# Patient Record
Sex: FEMALE | Race: WHITE | Employment: OTHER | URBAN - METROPOLITAN AREA
[De-identification: names, ages, dates, MRNs, and addresses within clinical notes are randomized per-mention and may not be internally consistent; named-entity substitution may affect disease eponyms.]

---

## 2017-01-03 DIAGNOSIS — E11.40 TYPE 2 DIABETES MELLITUS WITH DIABETIC NEUROPATHY, WITHOUT LONG-TERM CURRENT USE OF INSULIN (H): Primary | ICD-10-CM

## 2017-01-03 NOTE — TELEPHONE ENCOUNTER
[facility] is calling and states that they need a rx for lancets  And contour test strips, please advise.     Bonnie Weaver, [place]

## 2017-01-04 ENCOUNTER — MYC MEDICAL ADVICE (OUTPATIENT)
Dept: FAMILY MEDICINE | Facility: CLINIC | Age: 67
End: 2017-01-04

## 2017-01-04 DIAGNOSIS — E11.40 TYPE 2 DIABETES MELLITUS WITH DIABETIC NEUROPATHY, WITHOUT LONG-TERM CURRENT USE OF INSULIN (H): Primary | ICD-10-CM

## 2017-01-04 RX ORDER — LANCETS
EACH MISCELLANEOUS
Qty: 100 EACH | Refills: 3 | Status: SHIPPED | OUTPATIENT
Start: 2017-01-04

## 2017-01-04 RX ORDER — LANCETS
EACH MISCELLANEOUS
Qty: 100 EACH | Status: SHIPPED | OUTPATIENT
Start: 2017-01-04 | End: 2017-01-04

## 2017-01-04 NOTE — TELEPHONE ENCOUNTER
Prescription approved per [facility] Refill Protocol.  Minal Miranda RN

## 2017-01-06 ENCOUNTER — MYC MEDICAL ADVICE (OUTPATIENT)
Dept: FAMILY MEDICINE | Facility: CLINIC | Age: 67
End: 2017-01-06

## 2017-01-24 ENCOUNTER — TELEPHONE (OUTPATIENT)
Dept: FAMILY MEDICINE | Facility: CLINIC | Age: 67
End: 2017-01-24

## 2017-01-24 NOTE — TELEPHONE ENCOUNTER
Panel Management Review      Patient has the following on her problem list:     Diabetes    ASA: Passed    Last A1C  A1C     11.0   12/21/2016  A1C      7.8   7/15/2016  A1C      7.0   3/4/2016  A1C      6.7   9/25/2015  A1C      7.4   6/23/2015  A1C tested: Failed    Last LDL:    CHOL      161   7/15/2016  HDL       61   7/15/2016  LDL       43   7/15/2016  TRIG      286   7/15/2016  CHOLHDLRATIO      3.2   6/8/2015  NHDL      100   7/15/2016    Is the patient on a Statin? YES             Is the patient on Aspirin? YES    Medications     HMG CoA Reductase Inhibitors    rosuvastatin (CRESTOR) 20 MG tablet    Salicylates    ASPIRIN 81 MG OR TABS          Last three blood pressure readings:  BP Readings from Last 3 Encounters:   12/21/16 132/77   07/15/16 134/74   03/04/16 116/74       Date of last diabetes office visit: 12/21/2016     Tobacco History:     History   Smoking status     Former Smoker   Smokeless tobacco     Never Used     Comment: 2002           Hypertension   Last three blood pressure readings:  BP Readings from Last 3 Encounters:   12/21/16 132/77   07/15/16 134/74   03/04/16 116/74     Blood pressure: Passed    HTN Guidelines:  Age 18-59 BP range:  Less than 140/90  Age 60-85 with Diabetes:  Less than 140/90  Age 60-85 without Diabetes:  less than 150/90      Composite cancer screening  Chart review shows that this patient is due/due soon for the following None  Summary:    Patient is due/failing the following:   A1C    Action needed:   Pt needs to have A1c checked and office visit in march.    Type of outreach:    I will remind closer to time frame.     Questions for provider review:    None                                                                                                                                    Lindsay Coy [facility]       Chart routed to Care Team .

## 2017-01-30 ENCOUNTER — TELEPHONE (OUTPATIENT)
Dept: FAMILY MEDICINE | Facility: CLINIC | Age: 67
End: 2017-01-30

## 2017-01-30 DIAGNOSIS — E11.40 TYPE 2 DIABETES MELLITUS WITH DIABETIC NEUROPATHY, WITHOUT LONG-TERM CURRENT USE OF INSULIN (H): Primary | ICD-10-CM

## 2017-01-30 NOTE — TELEPHONE ENCOUNTER
Received a fax from [facility] [place]    Re: blood glucose monitoring (ANGELICA CONTOUR) test strip, Sig: Test BS 1 x / day, Disp 100 x3, Approved 01/04/2017    Request note: Patient states that she tests 4 time daily - Please send new Rx for the directions & include diagnosis code for medicare billing.

## 2017-02-08 ENCOUNTER — TELEPHONE (OUTPATIENT)
Dept: FAMILY MEDICINE | Facility: CLINIC | Age: 67
End: 2017-02-08

## 2017-02-08 NOTE — TELEPHONE ENCOUNTER
Panel Management Review      Patient has the following on her problem list:     Diabetes    ASA: Passed    Last A1C  A1C     11.0   12/21/2016  A1C      7.8   7/15/2016  A1C      7.0   3/4/2016  A1C      6.7   9/25/2015  A1C      7.4   6/23/2015  A1C tested: Failed    Last LDL:    CHOL      161   7/15/2016  HDL       61   7/15/2016  LDL       43   7/15/2016  TRIG      286   7/15/2016  CHOLHDLRATIO      3.2   6/8/2015  NHDL      100   7/15/2016    Is the patient on a Statin? YES             Is the patient on Aspirin? YES    Medications     HMG CoA Reductase Inhibitors    rosuvastatin (CRESTOR) 20 MG tablet    Salicylates    ASPIRIN 81 MG OR TABS          Last three blood pressure readings:  BP Readings from Last 3 Encounters:   12/21/16 132/77   07/15/16 134/74   03/04/16 116/74       Date of last diabetes office visit: 12/21/16     Tobacco History:     History   Smoking status     Former Smoker   Smokeless tobacco     Never Used     Comment: 2002           Hypertension   Last three blood pressure readings:  BP Readings from Last 3 Encounters:   12/21/16 132/77   07/15/16 134/74   03/04/16 116/74     Blood pressure: Passed    HTN Guidelines:  Age 18-59 BP range:  Less than 140/90  Age 60-85 with Diabetes:  Less than 140/90  Age 60-85 without Diabetes:  less than 150/90      Composite cancer screening  Chart review shows that this patient is due/due soon for the following Colonoscopy  Summary:    Patient is due/failing the following:   A1C and COLONOSCOPY    Action needed:   Patient needs office visit for diabetes in march.I will send out colonoscopy instructions and number to schedule.   Type of outreach:    Sent letter.    Questions for provider review:    None                                                                                                                                    Lindsay Coy [facility]       Chart routed to Care Team .

## 2017-02-08 NOTE — Clinical Note
[facility]  [address] [place] [address]  [id number]  [phone]      February 8, 2017      Wilda A Delgado  [id number] [address]   [facility] [address]              Dear Ms. Natarajan,    In order to ensure we are providing the best quality care,  and I have reviewed your chart and see that you are due for a Diabetes follow up, including lab work in march. I have also noticed you are due for a colonoscopy, I will go ahead and send the information to you and you can set up an appointment.     Please call to set up an office visit at [phone].    We greatly appreciate the opportunity to serve you. Thank you for trusting us with your health care.    Sincerely,      Mukesh Arguelles MD/ Lindsay FERMIN CMA

## 2017-03-17 ENCOUNTER — OFFICE VISIT (OUTPATIENT)
Dept: FAMILY MEDICINE | Facility: CLINIC | Age: 67
End: 2017-03-17
Payer: MEDICARE

## 2017-03-17 VITALS
DIASTOLIC BLOOD PRESSURE: 75 MMHG | HEART RATE: 72 BPM | WEIGHT: 205.4 LBS | BODY MASS INDEX: 31.13 KG/M2 | TEMPERATURE: 97.7 F | SYSTOLIC BLOOD PRESSURE: 105 MMHG | HEIGHT: 68 IN

## 2017-03-17 DIAGNOSIS — I10 ESSENTIAL HYPERTENSION: ICD-10-CM

## 2017-03-17 DIAGNOSIS — E03.4 HYPOTHYROIDISM DUE TO ACQUIRED ATROPHY OF THYROID: ICD-10-CM

## 2017-03-17 DIAGNOSIS — E08.49 OTHER DIABETIC NEUROLOGICAL COMPLICATION ASSOCIATED WITH DIABETES MELLITUS DUE TO UNDERLYING CONDITION (H): ICD-10-CM

## 2017-03-17 DIAGNOSIS — M17.0 PRIMARY OSTEOARTHRITIS OF BOTH KNEES: ICD-10-CM

## 2017-03-17 DIAGNOSIS — E78.5 HYPERLIPIDEMIA LDL GOAL <100: ICD-10-CM

## 2017-03-17 DIAGNOSIS — E66.3 OVERWEIGHT (BMI 25.0-29.9): ICD-10-CM

## 2017-03-17 DIAGNOSIS — E11.40 TYPE 2 DIABETES MELLITUS WITH DIABETIC NEUROPATHY, WITHOUT LONG-TERM CURRENT USE OF INSULIN (H): Primary | ICD-10-CM

## 2017-03-17 LAB — HBA1C MFR BLD: 6.7 % (ref 4.3–6)

## 2017-03-17 PROCEDURE — 36415 COLL VENOUS BLD VENIPUNCTURE: CPT | Performed by: FAMILY MEDICINE

## 2017-03-17 PROCEDURE — 99214 OFFICE O/P EST MOD 30 MIN: CPT | Mod: 25 | Performed by: FAMILY MEDICINE

## 2017-03-17 PROCEDURE — 83036 HEMOGLOBIN GLYCOSYLATED A1C: CPT | Performed by: FAMILY MEDICINE

## 2017-03-17 PROCEDURE — 90732 PPSV23 VACC 2 YRS+ SUBQ/IM: CPT | Performed by: FAMILY MEDICINE

## 2017-03-17 PROCEDURE — G0009 ADMIN PNEUMOCOCCAL VACCINE: HCPCS | Performed by: FAMILY MEDICINE

## 2017-03-17 PROCEDURE — 99207 C FOOT EXAM  NO CHARGE: CPT | Performed by: FAMILY MEDICINE

## 2017-03-17 NOTE — ASSESSMENT & PLAN NOTE
Lab Results   Component Value Date    TSH 1.32 07/15/2016     Dose stable. No s/s of over/under treatment. Refill for 1 yr if stable.

## 2017-03-17 NOTE — PATIENT INSTRUCTIONS
Type 2 diabetes mellitus with diabetic neuropathy (H)  Keep up the good work. Until you get below 6 or start having side effect(s), we can stay on the metformin as it may help you hit your goal weight.     Primary osteoarthritis of both knees  Keep losing weight and keep your legs strong.  Strong muscles will help you maintain your knee function and help you recover faster once you have surgery.     Hypothyroidism  Lab Results   Component Value Date    TSH 1.32 07/15/2016     Dose stable. No s/s of over/under treatment. Refill for 1 yr if stable.     Essential hypertension  Blood pressure is adequately controlled. Healthy lifestyle encouraged. Recheck every 6-12 months if stable and yearly labs.      Overweight (BMI 25.0-29.9)  Keep up the great work!      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      Make appointment for your colonoscopy.  :-)

## 2017-03-17 NOTE — ASSESSMENT & PLAN NOTE
Blood pressure is adequately controlled. Healthy lifestyle encouraged. Recheck every 6-12 months if stable and yearly labs.

## 2017-03-17 NOTE — PROGRESS NOTES
"  SUBJECTIVE:                                                    Wilda Natarajan is a 66 year old female who presents to clinic today for the following health issues:        Diabetes Follow-up    Patient is checking blood sugars: 2 times daily.    Blood sugar testing frequency justification: Uncontrolled diabetes  Results are as follows:         am - 100    Diabetic concerns: None     Symptoms of hypoglycemia (low blood sugar): none     Paresthesias (numbness or burning in feet) or sores: Yes      Date of last diabetic eye exam: November 2016  Lab Results   Component Value Date    A1C 6.7 03/17/2017        Hyperlipidemia Follow-Up      Rate your low fat/cholesterol diet?: fair    Taking statin?  Yes, no muscle aches from statin    Other lipid medications/supplements?:  Fish oil/Omega 3, dose  without side effects  Lab Results   Component Value Date    LDL 43 07/15/2016        Hypertension Follow-up      Outpatient blood pressures are not being checked.    Low Salt Diet: no added salt  Lab Results   Component Value Date    CR 0.69 07/15/2016            Amount of exercise or physical activity: 4-5 days/week for an average of 45-60 minutes    Problems taking medications regularly: No    Medication side effects: none    Diet: low salt and low fat/cholesterol          Problem list and histories reviewed & adjusted, as indicated.  Additional history: as documented        Reviewed and updated as needed this visit by clinical staff  Tobacco  Allergies  Meds  Problems  Med Hx  Surg Hx  Fam Hx  Soc Hx        Reviewed and updated as needed this visit by Provider  Tobacco  Allergies  Meds  Problems  Med Hx  Surg Hx  Fam Hx  Soc Hx          ROS:  Constitutional, HEENT, cardiovascular, pulmonary, gi and gu, neuro and derm systems are otherwise negative.     OBJECTIVE:                                                    /75  Pulse 72  Temp 97.7  F (36.5  C) (Tympanic)  Ht 5' 8\" (1.727 m)  Wt 205 lb 6.4 oz " (93.2 kg)  Breastfeeding? No  BMI 31.23 kg/m2  Body mass index is 31.23 kg/(m^2).   GENERAL: Pleasant and interactive.  Alert and oriented x 3.  No acute distress.  FEET: both sides normal; no swelling, tenderness or skin or vascular lesions.  Sensation is intact. Peripheral pulses are palpable. Toenails are normal.  HEART:  S1 and S2 normal, no murmurs, clicks, gallops or rubs. Regular rate and rhythm.  CHEST:  clear, no wheezing or rales. Normal symmetric air entry throughout both lung fields. No chest wall deformities or tenderness.        Diagnostic Test Results:  Lab Results   Component Value Date    A1C 6.7 03/17/2017     Lab Results   Component Value Date    LDL 43 07/15/2016     Lab Results   Component Value Date    HDL 61 07/15/2016     Lab Results   Component Value Date    TRIG 286 07/15/2016     Lab Results   Component Value Date    TSH 1.32 07/15/2016      No results found for: MICROALBUMIN  Lab Results   Component Value Date    CR 0.69 07/15/2016           ASSESSMENT/PLAN:                                                      Problem List Items Addressed This Visit        Medium    Hypothyroidism     Lab Results   Component Value Date    TSH 1.32 07/15/2016     Dose stable. No s/s of over/under treatment. Refill for 1 yr if stable.          Essential hypertension     Blood pressure is adequately controlled. Healthy lifestyle encouraged. Recheck every 6-12 months if stable and yearly labs.           Overweight (BMI 25.0-29.9)     Keep up the great work!           Hyperlipidemia LDL goal <100    Primary osteoarthritis of both knees     Keep losing weight and keep your legs strong.  Strong muscles will help you maintain your knee function and help you recover faster once you have surgery.          Type 2 diabetes mellitus with diabetic neuropathy (H) - Primary     Keep up the good work. Until you get below 6 or start having side effect(s), we can stay on the metformin as it may help you hit your goal  "weight.          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                   BMI:   Estimated body mass index is 31.23 kg/(m^2) as calculated from the following:    Height as of this encounter: 5' 8\" (1.727 m).    Weight as of this encounter: 205 lb 6.4 oz (93.2 kg).   Weight management plan: Discussed healthy diet and exercise guidelines and patient will follow up in 3 months in clinic to re-evaluate.      Work on weight loss  Regular exercise  See Patient Instructions    Mukesh Arguelles MD  [facility]  "

## 2017-03-17 NOTE — MR AVS SNAPSHOT
After Visit Summary   3/17/2017    Wilda Natarajan    MRN: [id number]           Patient Information     Date Of Birth          1950        Visit Information        Provider Department      3/17/2017 1:00 PM Mukesh Arguelles MD 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 Until you get below 6 or start having side effect(s), we can stay on the metformin as it may help you hit your goal weight.     Primary osteoarthritis of both knees  Keep losing weight and keep your legs strong.  Strong muscles will help you maintain your knee function and help you recover faster once you have surgery.     Hypothyroidism  Lab Results   Component Value Date    TSH 1.32 07/15/2016     Dose stable. No s/s of over/under treatment. Refill for 1 yr if stable.     Essential hypertension  Blood pressure is adequately controlled. Healthy lifestyle encouraged. Recheck every 6-12 months if stable and yearly labs.      Overweight (BMI 25.0-29.9)  Keep up the great work!      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      Make appointment for your colonoscopy.  :-)        Follow-ups after your visit        Follow-up notes from your care team     Return in about 3 months (around 6/17/2017), or Fasting., for Routine Visit, Lab Work.      Who to contact     Normal or non-critical lab and  "imaging results will be communicated to you by MyChart, letter or phone within 4 business days after the clinic has received the results. If you do not hear from us within 7 days, please contact the clinic through [employer] or phone. If you have a critical or abnormal lab result, we will notify you by phone as soon as possible.  Submit refill requests through [email or web address] or call your pharmacy and they will forward the refill request to us. Please allow 3 business days for your refill to be completed.          If you need to speak with a  for additional information , please call: [phone]           Additional Information About Your Visit        [email or web address] Information     [email or web address] gives you secure access to your electronic health record. If you see a primary care provider, you can also send messages to your care team and make appointments. If you have questions, please call your primary care clinic.  If you do not have a primary care provider, please call [phone] and they will assist you.        Care EveryWhere ID     This is your Care EveryWhere ID. This could be used by other organizations to access your [place] medical records  [id number]        Your Vitals Were     Pulse Temperature Height Breastfeeding? BMI (Body Mass Index)       72 97.7  F (36.5  C) (Tympanic) 5' 8\" (1.727 m) No 31.23 kg/m2        Blood Pressure from Last 3 Encounters:   03/17/17 105/75   12/21/16 132/77   07/15/16 134/74    Weight from Last 3 Encounters:   03/17/17 205 lb 6.4 oz (93.2 kg)   12/21/16 216 lb (98 kg)   07/15/16 206 lb 4.8 oz (93.6 kg)              We Performed the Following     FOOT EXAM     Hemoglobin A1c     PNEUMOVOCCAL VACCINE 23 VALENT (PNEUMOVAX 23)        Primary Care Provider Office Phone # Fax #    Mukesh Arguelles [phone]       [facility] [address] [place] [address]        Thank you!     Thank you for choosing [facility]  for your care. " Our goal is always to provide you with excellent care. Hearing back from our patients is one way we can continue to improve our services. Please take a few minutes to complete the written survey that you may receive in the mail after your visit with us. Thank you!             Your Updated Medication List - Protect others around you: Learn how to safely use, store and throw away your medicines at www.disposemymeds.org.          This list is accurate as of: 3/17/17  1:49 PM.  Always use your most recent med list.                   Brand Name Dispense Instructions for use    aspirin 81 MG tablet     100    1 TABLET DAILY       blood glucose monitoring test strip    ANGELICA CONTOUR    100 strip    Test BS 4 x / day.       GLUCOSAMINE CHONDR 500 COMPLEX PO      1 tablet daily       hydrochlorothiazide 25 MG tablet    HYDRODIURIL    180 tablet    Take 2 tablets (50 mg) by mouth daily       levothyroxine 75 MCG tablet    SYNTHROID    90 tablet    Take 1 tablet (75 mcg) by mouth daily       metFORMIN 500 MG tablet    GLUCOPHAGE    360 tablet    500 two times daily x 1 week. Then 1000 qam & 500 qpm x 1 week. Then 1000 two times daily.       MICROLET LANCETS Misc     100 each    Use to test blood glucose once daily       Omega-3 1000 MG Caps     100    1-2 a day       potassium chloride 10 MEQ tablet    potassium chloride    90 tablet    Take 1 tablet (10 mEq) by mouth daily       rosuvastatin 20 MG tablet    CRESTOR    45 tablet    Take 1 tablet (20 mg) by mouth every 48 hours

## 2017-03-17 NOTE — ASSESSMENT & PLAN NOTE
Keep up the good work. Until you get below 6 or start having side effect(s), we can stay on the metformin as it may help you hit your goal weight.

## 2017-03-17 NOTE — ASSESSMENT & PLAN NOTE
Keep losing weight and keep your legs strong.  Strong muscles will help you maintain your knee function and help you recover faster once you have surgery.

## 2017-03-22 DIAGNOSIS — E11.40 TYPE 2 DIABETES MELLITUS WITH DIABETIC NEUROPATHY, WITHOUT LONG-TERM CURRENT USE OF INSULIN (H): ICD-10-CM

## 2017-03-22 DIAGNOSIS — I10 ESSENTIAL HYPERTENSION: ICD-10-CM

## 2017-03-22 RX ORDER — POTASSIUM CHLORIDE 750 MG/1
10 TABLET, EXTENDED RELEASE ORAL DAILY
Qty: 90 TABLET | Refills: 1 | Status: SHIPPED | OUTPATIENT
Start: 2017-03-22 | End: 2017-09-18

## 2017-03-22 RX ORDER — HYDROCHLOROTHIAZIDE 25 MG/1
50 TABLET ORAL DAILY
Qty: 180 TABLET | Refills: 1 | Status: SHIPPED | OUTPATIENT
Start: 2017-03-22 | End: 2017-09-18

## 2017-03-22 NOTE — TELEPHONE ENCOUNTER
Prescription approved per [facility] Refill Protocol.  Minal Miranda RN

## 2017-03-22 NOTE — TELEPHONE ENCOUNTER
[facility]      Last Written Prescription Date: 3/4/2016  Last Fill Quantity: 180, # refills: 3  Last Office Visit with [facility] or [facility] prescribing provider: 3/17/2017       Potassium   Date Value Ref Range Status   07/15/2016 4.0 3.4 - 5.3 mmol/L Final     Creatinine   Date Value Ref Range Status   07/15/2016 0.69 0.52 - 1.04 mg/dL Final     BP Readings from Last 3 Encounters:   03/17/17 105/75   12/21/16 132/77   07/15/16 134/74           Potassium      Last Written Prescription Date: 3/4/2016  Last Fill Quantity: 90, # refills: 3  Last Office Visit with [facility], [facility] or [facility] prescribing provider: 3/17/2017       Potassium   Date Value Ref Range Status   07/15/2016 4.0 3.4 - 5.3 mmol/L Final     Creatinine   Date Value Ref Range Status   07/15/2016 0.69 0.52 - 1.04 mg/dL Final     BP Readings from Last 3 Encounters:   03/17/17 105/75   12/21/16 132/77   07/15/16 134/74

## 2017-05-09 DIAGNOSIS — E78.00 PURE HYPERCHOLESTEROLEMIA: ICD-10-CM

## 2017-05-09 NOTE — TELEPHONE ENCOUNTER
Rosuvastatin  20mg     Last Written Prescription Date: 07/18/2016 #45 x 2  Last filled 01/27/2017     Last Office Visit with [facility], [facility] or [facility] prescribing provider: 03/17/2017  BEV Arguelles       Lab Results   Component Value Date    CHOL 161 07/15/2016     Lab Results   Component Value Date    HDL 61 07/15/2016     Lab Results   Component Value Date    LDL 43 07/15/2016     Lab Results   Component Value Date    TRIG 286 07/15/2016     Lab Results   Component Value Date    CHOLHDLRATIO 3.2 06/08/2015

## 2017-05-10 RX ORDER — ROSUVASTATIN CALCIUM 20 MG/1
TABLET, COATED ORAL
Qty: 45 TABLET | Refills: 1 | Status: SHIPPED | OUTPATIENT
Start: 2017-05-10 | End: 2017-10-27

## 2017-05-10 NOTE — TELEPHONE ENCOUNTER
Prescription approved per [facility] Refill Protocol or patient Primary care provider (PCP)  ADELITA Baldwin RN/Marvin Chatman

## 2017-05-22 DIAGNOSIS — E11.40 TYPE 2 DIABETES MELLITUS WITH DIABETIC NEUROPATHY, WITHOUT LONG-TERM CURRENT USE OF INSULIN (H): ICD-10-CM

## 2017-05-22 NOTE — TELEPHONE ENCOUNTER
Contour test strips      Last Written Prescription Date: 05/03/2017 #100 x 3 [facility]  Last filled 02/02/2017 [facility]  Last Office Visit with [facility], [facility] or [facility] prescribing provider: 03/17/2017 BEV Arguelles       Note: request states patient testing once daily                                        Next 5 appointments (look out 90 days)     Jun 16, 2017 11:00 AM CDT   [facility] with Mukesh Arguelles MD   [facility]    [address] [address] [phone]

## 2017-06-16 ENCOUNTER — OFFICE VISIT (OUTPATIENT)
Dept: FAMILY MEDICINE | Facility: CLINIC | Age: 67
End: 2017-06-16
Payer: MEDICARE

## 2017-06-16 VITALS
TEMPERATURE: 97.4 F | OXYGEN SATURATION: 99 % | WEIGHT: 204 LBS | DIASTOLIC BLOOD PRESSURE: 80 MMHG | HEIGHT: 68 IN | HEART RATE: 55 BPM | BODY MASS INDEX: 30.92 KG/M2 | SYSTOLIC BLOOD PRESSURE: 122 MMHG

## 2017-06-16 DIAGNOSIS — E66.3 OVERWEIGHT (BMI 25.0-29.9): ICD-10-CM

## 2017-06-16 DIAGNOSIS — E11.40 TYPE 2 DIABETES MELLITUS WITH DIABETIC NEUROPATHY, WITHOUT LONG-TERM CURRENT USE OF INSULIN (H): Primary | ICD-10-CM

## 2017-06-16 DIAGNOSIS — I10 ESSENTIAL HYPERTENSION: ICD-10-CM

## 2017-06-16 DIAGNOSIS — E78.5 HYPERLIPIDEMIA LDL GOAL <100: ICD-10-CM

## 2017-06-16 DIAGNOSIS — E03.4 HYPOTHYROIDISM DUE TO ACQUIRED ATROPHY OF THYROID: ICD-10-CM

## 2017-06-16 DIAGNOSIS — E08.49 OTHER DIABETIC NEUROLOGICAL COMPLICATION ASSOCIATED WITH DIABETES MELLITUS DUE TO UNDERLYING CONDITION (H): ICD-10-CM

## 2017-06-16 DIAGNOSIS — D12.6 BENIGN NEOPLASM OF COLON, UNSPECIFIED PART OF COLON: ICD-10-CM

## 2017-06-16 DIAGNOSIS — M17.0 PRIMARY OSTEOARTHRITIS OF BOTH KNEES: ICD-10-CM

## 2017-06-16 LAB
ANION GAP SERPL CALCULATED.3IONS-SCNC: 4 MMOL/L (ref 3–14)
BUN SERPL-MCNC: 11 MG/DL (ref 7–30)
CALCIUM SERPL-MCNC: 9.8 MG/DL (ref 8.5–10.1)
CHLORIDE SERPL-SCNC: 100 MMOL/L (ref 94–109)
CHOLEST SERPL-MCNC: 156 MG/DL
CO2 SERPL-SCNC: 30 MMOL/L (ref 20–32)
CREAT SERPL-MCNC: 0.63 MG/DL (ref 0.52–1.04)
CREAT UR-MCNC: 86 MG/DL
GFR SERPL CREATININE-BSD FRML MDRD: ABNORMAL ML/MIN/1.7M2
GLUCOSE SERPL-MCNC: 124 MG/DL (ref 70–99)
HBA1C MFR BLD: 6.6 % (ref 4.3–6)
HDLC SERPL-MCNC: 75 MG/DL
LDLC SERPL CALC-MCNC: 53 MG/DL
MICROALBUMIN UR-MCNC: 8 MG/L
MICROALBUMIN/CREAT UR: 9.19 MG/G CR (ref 0–25)
NONHDLC SERPL-MCNC: 81 MG/DL
POTASSIUM SERPL-SCNC: 4.2 MMOL/L (ref 3.4–5.3)
SODIUM SERPL-SCNC: 134 MMOL/L (ref 133–144)
TRIGL SERPL-MCNC: 138 MG/DL
TSH SERPL DL<=0.05 MIU/L-ACNC: 0.99 MU/L (ref 0.4–4)

## 2017-06-16 PROCEDURE — 80061 LIPID PANEL: CPT | Performed by: FAMILY MEDICINE

## 2017-06-16 PROCEDURE — 80048 BASIC METABOLIC PNL TOTAL CA: CPT | Performed by: FAMILY MEDICINE

## 2017-06-16 PROCEDURE — 99214 OFFICE O/P EST MOD 30 MIN: CPT | Performed by: FAMILY MEDICINE

## 2017-06-16 PROCEDURE — 84443 ASSAY THYROID STIM HORMONE: CPT | Performed by: FAMILY MEDICINE

## 2017-06-16 PROCEDURE — 99207 C FOOT EXAM  NO CHARGE: CPT | Performed by: FAMILY MEDICINE

## 2017-06-16 PROCEDURE — 83036 HEMOGLOBIN GLYCOSYLATED A1C: CPT | Performed by: FAMILY MEDICINE

## 2017-06-16 PROCEDURE — 36415 COLL VENOUS BLD VENIPUNCTURE: CPT | Performed by: FAMILY MEDICINE

## 2017-06-16 PROCEDURE — 82043 UR ALBUMIN QUANTITATIVE: CPT | Performed by: FAMILY MEDICINE

## 2017-06-16 NOTE — PROGRESS NOTES
SUBJECTIVE:                                                    Wilda Natarajan is a 66 year old female who presents to clinic today for the following health issues:    Diabetes Follow-up    Patient is checking blood sugars: 1 time daily.    Blood sugar testing frequency justification: Adjustment of medication(s)  Results are as follows:         am - 120-150    Diabetic concerns: None     Symptoms of hypoglycemia (low blood sugar): none     Paresthesias (numbness or burning in feet) or sores: No     Date of last diabetic eye exam: within the past year      Hyperlipidemia Follow-Up      Rate your low fat/cholesterol diet?: good    Taking statin?  Yes, no muscle aches from statin    Other lipid medications/supplements?:  Fish oil/Omega 3, dose 1000mg without side effects       Amount of exercise or physical activity: 4-5 days/week for an average of greater than 60 minutes    Problems taking medications regularly: No    Medication side effects: none    Diet: regular (no restrictions)      Problem list and histories reviewed & adjusted, as indicated.  Additional history: as documented        Reviewed and updated as needed this visit by clinical staff  Tobacco  Allergies  Med Hx  Surg Hx  Fam Hx  Soc Hx      Reviewed and updated as needed this visit by Provider         1. Type 2 diabetes mellitus with diabetic neuropathy, without long-term current use of insulin (H)    2. Primary osteoarthritis of both knees    3. Overweight (BMI 25.0-29.9)    4. Hyperlipidemia LDL goal <100    5. Essential hypertension    6. Hypothyroidism due to acquired atrophy of thyroid    7. Benign neoplasm of colon, unspecified part of colon    8. 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    PSH: Updated and/or reviewed in chart.    Family History: Updated and/or reviewed in chart.     ROS:  Constitutional, neuro, EMT, endocrine,  "pulmonary, cardiac, gastrointestinal, genitourinary, musculoskeletal, integument and psychiatric systems are otherwise negative.    OBJECTIVE:                                                    /80 (BP Location: Right arm, Patient Position: Chair, Cuff Size: Adult Regular)  Pulse 55  Temp 97.4  F (36.3  C) (Tympanic)  Ht 5' 8\" (1.727 m)  Wt 204 lb (92.5 kg)  SpO2 99%  BMI 31.02 kg/m2  GENERAL: Pleasant and interactive.  Alert and oriented x 3.  No acute distress.  PSYCH: Alert and oriented times 3; coherent speech, normal rate and volume, able to articulate logical thoughts, able to abstract reason, no tangential thoughts, no hallucinations or delusions  Her affect is normal.  FEET: both sides normal; no swelling, tenderness or skin or vascular lesions.  Sensation is intact. Peripheral pulses are palpable. Toenails are normal.    Results for orders placed or performed in visit on 06/16/17   Hemoglobin A1c   Result Value Ref Range    Hemoglobin A1C 6.6 (H) 4.3 - 6.0 %      LABS: Ordered and pending at this time.        ASSESSMENT/PLAN:                                                        ICD-10-CM    1. Type 2 diabetes mellitus with diabetic neuropathy, without long-term current use of insulin (H) E11.40 Hemoglobin A1c     TSH     Basic metabolic panel     Albumin Random Urine Quantitative     FOOT EXAM   2. Primary osteoarthritis of both knees M17.0    3. Overweight (BMI 25.0-29.9) E66.3    4. Hyperlipidemia LDL goal <100 E78.5 **Lipid panel reflex to direct LDL FUTURE anytime   5. Essential hypertension I10 Basic metabolic panel   6. Hypothyroidism due to acquired atrophy of thyroid E03.4    7. Benign neoplasm of colon, unspecified part of colon D12.6 GASTROENTEROLOGY ADULT REF PROCEDURE ONLY   8. 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    Patient Instructions     Diabetic " "Goals:          Hgb A1C (6wk avg)             <7     Lab Results   Component Value Date    A1C 6.6 06/16/2017     LDL (bad) Cholesterol         <100  Lab Results   Component Value Date    LDL 43 07/15/2016       Blood Pressure                <130/<80   BP Readings from Last 1 Encounters:   06/16/17 122/80     Body Mass Index               <27   Estimated body mass index is 31.02 kg/(m^2) as calculated from the following:    Height as of this encounter: 5' 8\" (1.727 m).    Weight as of this encounter: 204 lb (92.5 kg).    Blood Sugar  (fasting)        70 -105  Blood Sugar (pre-meal)         Blood Sugar (2hr after)        100-160    Daily:    Blood sugar check(s) if instructed.    Self foot examination for sores, calluses, sensation, etc.    Exercise or physical activity    Aspirin  mg (ask your physician prior to starting)    Doctor Visits:    Every 3 months (may go to 6 months if all goals met and stable.)    Hgb A1C every visit (can be done non-fasting at visit)    Foot exams every visit.    Fasting cholesterol if changes have been made in the last 3 months.    Yearly:    Dilated eye exams.    Urine test for microalbumin (checks for early kidney damage).    Flu Vaccine every Fall.    Fasting cholesterol (if well controlled)    Diabetic Education update (insurance usually covers 1 hour a year).     Every 10 Years:    Pneumococcal Vaccine.    Tetanus Booster.      Orders Placed This Encounter     FOOT EXAM     Hemoglobin A1c     TSH     Basic metabolic panel     Albumin Random Urine Quantitative     **Lipid panel reflex to direct LDL FUTURE anytime     GASTROENTEROLOGY ADULT REF PROCEDURE ONLY        BMI:   Estimated body mass index is 31.02 kg/(m^2) as calculated from the following:    Height as of this encounter: 5' 8\" (1.727 m).    Weight as of this encounter: 204 lb (92.5 kg).   Weight management plan: Discussed healthy diet and exercise guidelines and patient will follow up in 3 months in clinic to " re-evaluate.      See Patient Instructions    Mukesh Arguelles MD

## 2017-06-16 NOTE — PATIENT INSTRUCTIONS
"Diabetic Goals:          Hgb A1C (6wk avg)             <7     Lab Results   Component Value Date    A1C 6.6 06/16/2017     LDL (bad) Cholesterol         <100  Lab Results   Component Value Date    LDL 43 07/15/2016       Blood Pressure                <130/<80   BP Readings from Last 1 Encounters:   06/16/17 122/80     Body Mass Index               <27   Estimated body mass index is 31.02 kg/(m^2) as calculated from the following:    Height as of this encounter: 5' 8\" (1.727 m).    Weight as of this encounter: 204 lb (92.5 kg).    Blood Sugar  (fasting)        70 -105  Blood Sugar (pre-meal)         Blood Sugar (2hr after)        100-160    Daily:    Blood sugar check(s) if instructed.    Self foot examination for sores, calluses, sensation, etc.    Exercise or physical activity    Aspirin  mg (ask your physician prior to starting)    Doctor Visits:    Every 3 months (may go to 6 months if all goals met and stable.)    Hgb A1C every visit (can be done non-fasting at visit)    Foot exams every visit.    Fasting cholesterol if changes have been made in the last 3 months.    Yearly:    Dilated eye exams.    Urine test for microalbumin (checks for early kidney damage).    Flu Vaccine every Fall.    Fasting cholesterol (if well controlled)    Diabetic Education update (insurance usually covers 1 hour a year).     Every 10 Years:    Pneumococcal Vaccine.    Tetanus Booster.   "

## 2017-06-16 NOTE — MR AVS SNAPSHOT
"              After Visit Summary   6/16/2017    Wilda Natarajan    MRN: [id number]           Patient Information     Date Of Birth          1950        Visit Information        Provider Department      6/16/2017 11:00 AM Mukesh Arguelles MD [facility]        Today's Diagnoses     Type 2 diabetes mellitus with diabetic neuropathy, without long-term current use of insulin (H)    -  1    Primary osteoarthritis of both knees        Overweight (BMI 25.0-29.9)        Hyperlipidemia LDL goal <100        Essential hypertension        Hypothyroidism due to acquired atrophy of thyroid        Benign neoplasm of colon, unspecified part of colon        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    Weight as of this encounter: 204 lb (92.5 kg).    Blood Sugar  (fasting)        70 -105  Blood Sugar (pre-meal)         Blood Sugar (2hr after)        100-160    Daily:    Blood sugar check(s) if instructed.    Self foot examination for sores, calluses, sensation, etc.    Exercise or physical activity    Aspirin  mg (ask your physician prior to starting)    Doctor Visits:    Every 3 months (may go to 6 months if all goals met and stable.)    Hgb A1C every visit (can be done non-fasting at visit)    Foot exams every visit.    Fasting cholesterol if changes have been made in the last 3 months.    Yearly:    Dilated eye exams.    Urine " test for microalbumin (checks for early kidney damage).    Flu Vaccine every Fall.    Fasting cholesterol (if well controlled)    Diabetic Education update (insurance usually covers 1 hour a year).     Every 10 Years:    Pneumococcal Vaccine.    Tetanus Booster.           Follow-ups after your visit        Additional Services     GASTROENTEROLOGY ADULT REF PROCEDURE ONLY       Last Lab Result: Creatinine (mg/dL)       Date                     Value                 07/15/2016               0.69             ----------  Body mass index is 31.02 kg/(m^2).      Patient will be contacted to schedule procedure.     Please be aware that coverage of these services is subject to the terms and limitations of your health insurance plan.  Call member services at your health plan with any benefit or coverage questions.  Any procedures must be performed at a [facility] facility OR coordinated by your clinic's referral office.    Please bring the following with you to your appointment:    (1) Any X-Rays, CTs or MRIs which have been performed.  Contact the facility where they were done to arrange for  prior to your scheduled appointment.    (2) List of current medications   (3) This referral request   (4) Any documents/labs given to you for this referral                  Follow-up notes from your care team     Return in about 3 months (around 9/16/2017).      Who to contact     Normal or non-critical lab and imaging results will be communicated to you by MyChart, letter or phone within 4 business days after the clinic has received the results. If you do not hear from us within 7 days, please contact the clinic through MyChart or phone. If you have a critical or abnormal lab result, we will notify you by phone as soon as possible.  Submit refill requests through [employer] or call your pharmacy and they will forward the refill request to us. Please allow 3 business days for your refill to be completed.          If you need to speak  "with a  for additional information , please call: [phone]           Additional Information About Your Visit        [employer] Information     [employer] gives you secure access to your electronic health record. If you see a primary care provider, you can also send messages to your care team and make appointments. If you have questions, please call your primary care clinic.  If you do not have a primary care provider, please call [phone] and they will assist you.        Care EveryWhere ID     This is your Care EveryWhere ID. This could be used by other organizations to access your [facility] medical records  [id number]        Your Vitals Were     Pulse Temperature Height Pulse Oximetry BMI (Body Mass Index)       55 97.4  F (36.3  C) (Tympanic) 5' 8\" (1.727 m) 99% 31.02 kg/m2        Blood Pressure from Last 3 Encounters:   06/16/17 122/80   03/17/17 105/75   12/21/16 132/77    Weight from Last 3 Encounters:   06/16/17 204 lb (92.5 kg)   03/17/17 205 lb 6.4 oz (93.2 kg)   12/21/16 216 lb (98 kg)              We Performed the Following     **Lipid panel reflex to direct LDL FUTURE anytime     Albumin Random Urine Quantitative     Basic metabolic panel     FOOT EXAM     GASTROENTEROLOGY ADULT REF PROCEDURE ONLY     Hemoglobin A1c     TSH        Primary Care Provider Office Phone # Fax #    Mukesh Arguelles [phone]       [facility] [address] [facility] [address]        Thank you!     Thank you for choosing [facility]  for your care. Our goal is always to provide you with excellent care. Hearing back from our patients is one way we can continue to improve our services. Please take a few minutes to complete the written survey that you may receive in the mail after your visit with us. Thank you!             Your Updated Medication List - Protect others around you: Learn how to safely use, store and throw away your medicines at " www.disposemymeds.org.          This list is accurate as of: 6/16/17 11:32 AM.  Always use your most recent med list.                   Brand Name Dispense Instructions for use    aspirin 81 MG tablet     100    1 TABLET DAILY       blood glucose monitoring test strip    ANGELICA CONTOUR    100 strip    1 strip by In Vitro route daily Test BS 4 x / day.       GLUCOSAMINE CHONDR 500 COMPLEX PO      1 tablet daily       hydrochlorothiazide 25 MG tablet    HYDRODIURIL    180 tablet    Take 2 tablets (50 mg) by mouth daily       levothyroxine 75 MCG tablet    SYNTHROID    90 tablet    Take 1 tablet (75 mcg) by mouth daily       metFORMIN 500 MG tablet    GLUCOPHAGE    360 tablet    500 two times daily x 1 week. Then 1000 qam & 500 qpm x 1 week. Then 1000 two times daily.       MICROLET LANCETS Misc     100 each    Use to test blood glucose once daily       Omega-3 1000 MG Caps     100    1-2 a day       potassium chloride 10 MEQ tablet    potassium chloride    90 tablet    Take 1 tablet (10 mEq) by mouth daily       rosuvastatin 20 MG tablet    CRESTOR    45 tablet    TAKE ONE TABLET BY MOUTH EVERY OTHER DAY

## 2017-06-17 NOTE — PROGRESS NOTES
Ms. Natarajan,    Your urine microalbumin test was normal.  This should be done yearly.    Your LDL (bad cholesterol)  was at goal. Your HDL (good cholesterol) was normal.  This is good. Your triglycerides were normal. You need to recheck fasting labs yearly.    Your chemistry and kidney function tests were normal. Your blood sugar was mildly elevated but this is expected for you.     Your TSH indicates that your thyroid function is currently in balance.  No additional testing is necessary for a year or unless you develop symptoms of over or underactive thyroid.    Your A1c was within the goal range for your. A normal level is below 6.     The goal for diabetics without other complications is less than 7. You should recheck your A1c in 3 months.     Please contact the clinic if you have additional questions.  Thank you.    Sincerely,    Steven Arguelles MD

## 2017-10-13 ENCOUNTER — OFFICE VISIT (OUTPATIENT)
Dept: FAMILY MEDICINE | Facility: CLINIC | Age: 67
End: 2017-10-13
Payer: MEDICARE

## 2017-10-13 VITALS
TEMPERATURE: 98.3 F | HEIGHT: 68 IN | HEART RATE: 76 BPM | WEIGHT: 211.6 LBS | DIASTOLIC BLOOD PRESSURE: 62 MMHG | SYSTOLIC BLOOD PRESSURE: 126 MMHG | BODY MASS INDEX: 32.07 KG/M2

## 2017-10-13 DIAGNOSIS — M17.0 PRIMARY OSTEOARTHRITIS OF BOTH KNEES: ICD-10-CM

## 2017-10-13 DIAGNOSIS — E03.4 HYPOTHYROIDISM DUE TO ACQUIRED ATROPHY OF THYROID: ICD-10-CM

## 2017-10-13 DIAGNOSIS — E11.40 TYPE 2 DIABETES MELLITUS WITH DIABETIC NEUROPATHY, WITHOUT LONG-TERM CURRENT USE OF INSULIN (H): Primary | ICD-10-CM

## 2017-10-13 DIAGNOSIS — Z12.11 SCREEN FOR COLON CANCER: ICD-10-CM

## 2017-10-13 DIAGNOSIS — I10 ESSENTIAL HYPERTENSION: ICD-10-CM

## 2017-10-13 DIAGNOSIS — Z78.0 ASYMPTOMATIC POSTMENOPAUSAL STATUS: ICD-10-CM

## 2017-10-13 DIAGNOSIS — Z91.81 AT RISK FOR FALLING: ICD-10-CM

## 2017-10-13 DIAGNOSIS — E78.5 HYPERLIPIDEMIA LDL GOAL <100: ICD-10-CM

## 2017-10-13 DIAGNOSIS — Z23 NEED FOR PROPHYLACTIC VACCINATION AND INOCULATION AGAINST INFLUENZA: ICD-10-CM

## 2017-10-13 LAB — HBA1C MFR BLD: 6.6 % (ref 4.3–6)

## 2017-10-13 PROCEDURE — G0008 ADMIN INFLUENZA VIRUS VAC: HCPCS | Performed by: FAMILY MEDICINE

## 2017-10-13 PROCEDURE — 90686 IIV4 VACC NO PRSV 0.5 ML IM: CPT | Performed by: FAMILY MEDICINE

## 2017-10-13 PROCEDURE — 83036 HEMOGLOBIN GLYCOSYLATED A1C: CPT | Performed by: FAMILY MEDICINE

## 2017-10-13 PROCEDURE — 36415 COLL VENOUS BLD VENIPUNCTURE: CPT | Performed by: FAMILY MEDICINE

## 2017-10-13 PROCEDURE — 99213 OFFICE O/P EST LOW 20 MIN: CPT | Mod: 25 | Performed by: FAMILY MEDICINE

## 2017-10-13 NOTE — MR AVS SNAPSHOT
"              After Visit Summary   10/13/2017    Wilda Natarajan    MRN: [id number]           Patient Information     Date Of Birth          1950        Visit Information        Provider Department      10/13/2017 2:30 PM Mukesh Arguelles MD [facility]        Today's Diagnoses     Type 2 diabetes mellitus with diabetic neuropathy, without long-term current use of insulin (H)    -  1    Screen for colon cancer        Asymptomatic postmenopausal status        At risk for falling        Essential hypertension        Hyperlipidemia LDL goal <100        Hypothyroidism due to acquired atrophy of thyroid        Primary osteoarthritis of both knees        Need for prophylactic vaccination and inoculation against influenza          Care Instructions    Make appointment for your colonosocpy and bone density testing when you get back from [place].    Diabetic Goals:          Hgb A1C (6wk avg)             <7     Lab Results   Component Value Date    A1C 6.6 10/13/2017     LDL (bad) Cholesterol         <100  Lab Results   Component Value Date    LDL 53 06/16/2017       Blood Pressure                <130/<80   BP Readings from Last 1 Encounters:   10/13/17 126/62     Body Mass Index               <27   Estimated body mass index is 32.17 kg/(m^2) as calculated from the following:    Height as of this encounter: 5' 8\" (1.727 m).    Weight as of this encounter: 211 lb 9.6 oz (96 kg).    Blood Sugar  (fasting)        70 -105  Blood Sugar (pre-meal)         Blood Sugar (2hr after)        100-160    Daily:    Blood sugar check(s) if instructed.    Self foot examination for sores, calluses, sensation, etc.    Exercise or physical activity    Aspirin  mg (ask your physician prior to starting)    Doctor Visits:    Every 3 months (may go to 6 months if all goals met and stable.)    Hgb A1C every visit (can be done non-fasting at visit)    Foot exams every visit.    Fasting cholesterol if changes have " "been made in the last 3 months.    Yearly:    Dilated eye exams.    Urine test for microalbumin (checks for early kidney damage).    Flu Vaccine every Fall.    Fasting cholesterol (if well controlled)    Diabetic Education update (insurance usually covers 1 hour a year).     Every 10 Years:    Pneumococcal Vaccine.    Tetanus Booster.           Follow-ups after your visit        Future tests that were ordered for you today     Open Future Orders        Priority Expected Expires Ordered    DEXA HIP/PELVIS/SPINE - Future Routine  10/13/2018 10/13/2017            Who to contact     Normal or non-critical lab and imaging results will be communicated to you by [employer], letter or phone within 4 business days after the clinic has received the results. If you do not hear from us within 7 days, please contact the clinic through [employer] or phone. If you have a critical or abnormal lab result, we will notify you by phone as soon as possible.  Submit refill requests through [employer] or call your pharmacy and they will forward the refill request to us. Please allow 3 business days for your refill to be completed.          If you need to speak with a  for additional information , please call: [phone]           Additional Information About Your Visit        [employer] Information     [employer] gives you secure access to your electronic health record. If you see a primary care provider, you can also send messages to your care team and make appointments. If you have questions, please call your primary care clinic.  If you do not have a primary care provider, please call [phone] and they will assist you.        Care EveryWhere ID     This is your Care EveryWhere ID. This could be used by other organizations to access your [place] medical records  [id number]        Your Vitals Were     Pulse Temperature Height BMI (Body Mass Index)          76 98.3  F (36.8  C) (Tympanic) 5' 8\" (1.727 m) 32.17 kg/m2         " Blood Pressure from Last 3 Encounters:   10/13/17 126/62   06/16/17 122/80   03/17/17 105/75    Weight from Last 3 Encounters:   10/13/17 211 lb 9.6 oz (96 kg)   06/16/17 204 lb (92.5 kg)   03/17/17 205 lb 6.4 oz (93.2 kg)              We Performed the Following     ADMIN INFLUENZA (For MEDICARE Patients ONLY) []     FLU VAC, SPLIT VIRUS IM > 3 YO (QUADRIVALENT) [83388]     HEMOGLOBIN A1C        Primary Care Provider Office Phone # Fax #    Mukesh Arguelles [phone]       [address] [place] [address]        Equal Access to Services     MARCELO BEYER : Hadii aad ku hadasho Soomaali, waaxda luqadaha, qaybta kaalmada adeegyada, waxay irlandain haykrisn brisa pichardo . So [facility] [phone].    ATENCIÓN: Si habla español, tiene a chicas disposición servicios gratuitos de asistencia lingüística. Llame al [phone].    We comply with applicable federal civil rights laws and [place] laws. We do not discriminate on the basis of race, color, national origin, age, disability, sex, sexual orientation, or gender identity.            Thank you!     Thank you for choosing [facility]  for your care. Our goal is always to provide you with excellent care. Hearing back from our patients is one way we can continue to improve our services. Please take a few minutes to complete the written survey that you may receive in the mail after your visit with us. Thank you!             Your Updated Medication List - Protect others around you: Learn how to safely use, store and throw away your medicines at www.disposemymeds.org.          This list is accurate as of: 10/13/17  3:29 PM.  Always use your most recent med list.                   Brand Name Dispense Instructions for use Diagnosis    aspirin 81 MG tablet     100    1 TABLET DAILY    Pure hypercholesterolemia       blood glucose monitoring test strip    ANGELICA CONTOUR    100 strip    1 strip by In Vitro route daily Test BS 4 x /  day.    Type 2 diabetes mellitus with diabetic neuropathy, without long-term current use of insulin (H)       GLUCOSAMINE CHONDR 500 COMPLEX PO      1 tablet daily        hydrochlorothiazide 25 MG tablet    HYDRODIURIL    180 tablet    TAKE TWO TABLETS BY MOUTH EVERY DAY    Essential hypertension       levothyroxine 75 MCG tablet    SYNTHROID/LEVOTHROID    90 tablet    TAKE ONE TABLET BY MOUTH EVERY DAY    Hypothyroidism due to acquired atrophy of thyroid       metFORMIN 500 MG tablet    GLUCOPHAGE    360 tablet    500 two times daily x 1 week. Then 1000 qam & 500 qpm x 1 week. Then 1000 two times daily.    Type 2 diabetes mellitus without complication, without long-term current use of insulin (H)       MICROLET LANCETS Misc     100 each    Use to test blood glucose once daily    Type 2 diabetes mellitus with diabetic neuropathy, without long-term current use of insulin (H)       Omega-3 1000 MG Caps     100    1-2 a day    Pure hypercholesterolemia       potassium chloride 10 MEQ tablet    K-TAB,KLOR-CON    90 tablet    TAKE ONE TABLET BY MOUTH EVERY DAY    Essential hypertension       rosuvastatin 20 MG tablet    CRESTOR    45 tablet    TAKE ONE TABLET BY MOUTH EVERY OTHER DAY    Pure hypercholesterolemia

## 2017-10-13 NOTE — PROGRESS NOTES
Injectable Influenza Immunization Documentation    1.  Is the person to be vaccinated sick today?   No    2. Does the person to be vaccinated have an allergy to a component   of the vaccine?   No    3. Has the person to be vaccinated ever had a serious reaction   to influenza vaccine in the past?   No    4. Has the person to be vaccinated ever had Guillain-Barré syndrome?   No    Form completed by Maci Scott MA

## 2017-10-13 NOTE — NURSING NOTE
"Chief Complaint   Patient presents with     Diabetes       Initial /62  Pulse 76  Temp 98.3  F (36.8  C) (Tympanic)  Ht 5' 8\" (1.727 m)  Wt 211 lb 9.6 oz (96 kg)  BMI 32.17 kg/m2 Estimated body mass index is 32.17 kg/(m^2) as calculated from the following:    Height as of this encounter: 5' 8\" (1.727 m).    Weight as of this encounter: 211 lb 9.6 oz (96 kg).  Medication Reconciliation: complete     Maci Scott MA      "

## 2017-10-13 NOTE — PATIENT INSTRUCTIONS
"Make appointment for your colonosocpy and bone density testing when you get back from [place].    Diabetic Goals:          Hgb A1C (6wk avg)             <7     Lab Results   Component Value Date    A1C 6.6 10/13/2017     LDL (bad) Cholesterol         <100  Lab Results   Component Value Date    LDL 53 06/16/2017       Blood Pressure                <130/<80   BP Readings from Last 1 Encounters:   10/13/17 126/62     Body Mass Index               <27   Estimated body mass index is 32.17 kg/(m^2) as calculated from the following:    Height as of this encounter: 5' 8\" (1.727 m).    Weight as of this encounter: 211 lb 9.6 oz (96 kg).    Blood Sugar  (fasting)        70 -105  Blood Sugar (pre-meal)         Blood Sugar (2hr after)        100-160    Daily:    Blood sugar check(s) if instructed.    Self foot examination for sores, calluses, sensation, etc.    Exercise or physical activity    Aspirin  mg (ask your physician prior to starting)    Doctor Visits:    Every 3 months (may go to 6 months if all goals met and stable.)    Hgb A1C every visit (can be done non-fasting at visit)    Foot exams every visit.    Fasting cholesterol if changes have been made in the last 3 months.    Yearly:    Dilated eye exams.    Urine test for microalbumin (checks for early kidney damage).    Flu Vaccine every Fall.    Fasting cholesterol (if well controlled)    Diabetic Education update (insurance usually covers 1 hour a year).     Every 10 Years:    Pneumococcal Vaccine.    Tetanus Booster.   "

## 2017-10-27 DIAGNOSIS — E78.00 PURE HYPERCHOLESTEROLEMIA: ICD-10-CM

## 2017-10-30 RX ORDER — ROSUVASTATIN CALCIUM 20 MG/1
TABLET, COATED ORAL
Qty: 45 TABLET | Refills: 1 | Status: SHIPPED | OUTPATIENT
Start: 2017-10-30

## 2017-10-30 NOTE — TELEPHONE ENCOUNTER
Prescription approved per [facility] Refill Protocol.    Senait BLANTON Rn

## 2017-11-27 ENCOUNTER — TRANSFERRED RECORDS (OUTPATIENT)
Dept: HEALTH INFORMATION MANAGEMENT | Facility: CLINIC | Age: 67
End: 2017-11-27

## 2018-01-31 DIAGNOSIS — E11.9 TYPE 2 DIABETES MELLITUS WITHOUT COMPLICATION, WITHOUT LONG-TERM CURRENT USE OF INSULIN (H): ICD-10-CM

## 2018-01-31 NOTE — TELEPHONE ENCOUNTER
I left a message for Wilda to call back and let us know her current dose of metformin and how often she is taking it during the day. Minal Miranda RN

## 2018-02-01 NOTE — TELEPHONE ENCOUNTER
Patient called back says she takes 2 500mg tabs twice a day.    [facility]

## 2018-02-01 NOTE — TELEPHONE ENCOUNTER
Prescription approved per [facility] Refill Protocol.  Minal Miranda RN

## 2018-02-10 ENCOUNTER — HEALTH MAINTENANCE LETTER (OUTPATIENT)
Age: 68
End: 2018-02-10

## 2018-03-15 DIAGNOSIS — I10 ESSENTIAL HYPERTENSION: ICD-10-CM

## 2018-03-15 RX ORDER — POTASSIUM CHLORIDE 750 MG/1
10 TABLET, EXTENDED RELEASE ORAL DAILY
Qty: 90 TABLET | Refills: 0 | Status: SHIPPED | OUTPATIENT
Start: 2018-03-15

## 2018-03-15 RX ORDER — HYDROCHLOROTHIAZIDE 25 MG/1
TABLET ORAL
Qty: 180 TABLET | Refills: 1 | Status: SHIPPED | OUTPATIENT
Start: 2018-03-15

## 2018-03-15 NOTE — TELEPHONE ENCOUNTER
Medication is being filled for 1 time refill only due to:  needs to establish with a new Provider as Dr. Arguelles is gone. Dav Aguirre RN

## 2018-06-20 DIAGNOSIS — E03.4 HYPOTHYROIDISM DUE TO ACQUIRED ATROPHY OF THYROID: ICD-10-CM

## 2018-06-20 DIAGNOSIS — E11.40 TYPE 2 DIABETES MELLITUS WITH DIABETIC NEUROPATHY, WITHOUT LONG-TERM CURRENT USE OF INSULIN (H): ICD-10-CM

## 2018-06-20 RX ORDER — LEVOTHYROXINE SODIUM 75 UG/1
75 TABLET ORAL DAILY
Qty: 90 TABLET | Refills: 0 | Status: SHIPPED | OUTPATIENT
Start: 2018-06-20

## 2018-06-20 NOTE — TELEPHONE ENCOUNTER
TSH   Date Value Ref Range Status   06/16/2017 0.99 0.40 - 4.00 mU/L Final     Medication is being filled for 1 time refill only due to:   Over due for office visit and/or labs   ADELITA Baldwin  RN/Marvin Chatman

## 2019-03-09 ENCOUNTER — HEALTH MAINTENANCE LETTER (OUTPATIENT)
Age: 69
End: 2019-03-09

## 2019-10-03 ENCOUNTER — HEALTH MAINTENANCE LETTER (OUTPATIENT)
Age: 69
End: 2019-10-03

## 2020-02-08 ENCOUNTER — HEALTH MAINTENANCE LETTER (OUTPATIENT)
Age: 70
End: 2020-02-08

## 2020-11-07 ENCOUNTER — HEALTH MAINTENANCE LETTER (OUTPATIENT)
Age: 70
End: 2020-11-07

## 2021-03-27 ENCOUNTER — HEALTH MAINTENANCE LETTER (OUTPATIENT)
Age: 71
End: 2021-03-27

## 2021-07-11 ENCOUNTER — HEALTH MAINTENANCE LETTER (OUTPATIENT)
Age: 71
End: 2021-07-11

## 2021-09-05 ENCOUNTER — HEALTH MAINTENANCE LETTER (OUTPATIENT)
Age: 71
End: 2021-09-05

## 2021-10-31 ENCOUNTER — HEALTH MAINTENANCE LETTER (OUTPATIENT)
Age: 71
End: 2021-10-31

## 2021-12-26 ENCOUNTER — HEALTH MAINTENANCE LETTER (OUTPATIENT)
Age: 71
End: 2021-12-26

## 2022-02-20 ENCOUNTER — HEALTH MAINTENANCE LETTER (OUTPATIENT)
Age: 72
End: 2022-02-20

## 2022-04-17 ENCOUNTER — HEALTH MAINTENANCE LETTER (OUTPATIENT)
Age: 72
End: 2022-04-17

## 2022-06-12 ENCOUNTER — HEALTH MAINTENANCE LETTER (OUTPATIENT)
Age: 72
End: 2022-06-12

## 2022-10-23 ENCOUNTER — HEALTH MAINTENANCE LETTER (OUTPATIENT)
Age: 72
End: 2022-10-23

## 2023-04-02 ENCOUNTER — HEALTH MAINTENANCE LETTER (OUTPATIENT)
Age: 73
End: 2023-04-02